# Patient Record
Sex: MALE | Race: BLACK OR AFRICAN AMERICAN | Employment: STUDENT | URBAN - METROPOLITAN AREA
[De-identification: names, ages, dates, MRNs, and addresses within clinical notes are randomized per-mention and may not be internally consistent; named-entity substitution may affect disease eponyms.]

---

## 2018-12-28 ENCOUNTER — HOSPITAL ENCOUNTER (EMERGENCY)
Facility: CLINIC | Age: 23
Discharge: HOME | End: 2018-12-28
Attending: EMERGENCY MEDICINE | Admitting: EMERGENCY MEDICINE
Payer: COMMERCIAL

## 2018-12-28 VITALS
OXYGEN SATURATION: 99 % | TEMPERATURE: 98.5 F | DIASTOLIC BLOOD PRESSURE: 77 MMHG | SYSTOLIC BLOOD PRESSURE: 139 MMHG | HEART RATE: 75 BPM

## 2018-12-28 DIAGNOSIS — M62.838 MUSCLE SPASM: ICD-10-CM

## 2018-12-28 DIAGNOSIS — M54.2 NECK PAIN: ICD-10-CM

## 2018-12-28 PROCEDURE — 99284 EMERGENCY DEPT VISIT MOD MDM: CPT | Mod: Z6 | Performed by: EMERGENCY MEDICINE

## 2018-12-28 PROCEDURE — 99282 EMERGENCY DEPT VISIT SF MDM: CPT | Performed by: EMERGENCY MEDICINE

## 2018-12-28 RX ORDER — ALBUTEROL SULFATE 90 UG/1
2 AEROSOL, METERED RESPIRATORY (INHALATION) PRN
COMMUNITY
End: 2019-05-22

## 2018-12-28 RX ORDER — NAPROXEN 500 MG/1
500 TABLET ORAL 2 TIMES DAILY WITH MEALS
Qty: 16 TABLET | Refills: 0 | Status: SHIPPED | OUTPATIENT
Start: 2018-12-28 | End: 2019-05-22

## 2018-12-28 RX ORDER — METHOCARBAMOL 500 MG/1
500 TABLET, FILM COATED ORAL AT BEDTIME
Qty: 5 TABLET | Refills: 0 | Status: SHIPPED | OUTPATIENT
Start: 2018-12-28 | End: 2019-05-22

## 2018-12-28 ASSESSMENT — ENCOUNTER SYMPTOMS
WEAKNESS: 0
BACK PAIN: 0
FEVER: 0
ABDOMINAL PAIN: 0
NECK PAIN: 1

## 2018-12-28 NOTE — TELEPHONE ENCOUNTER
FUTURE VISIT INFORMATION      FUTURE VISIT INFORMATION:    Date: 12/31/18    Time:     [place] [facility] [place] INFORMATION:    Referring provider:  self    Referring providers clinic:      Reason for visit/diagnosis  Neck pain causing numbness - ER notes in Epic - No xrays - Per pt's mother     RECORDS REQUESTED FROM:       Clinic name Comments Records Status Imaging Status     internal

## 2018-12-28 NOTE — ED AVS SNAPSHOT
[facility], [place], Emergency Department  [address] [place] [id number]  Phone:  [phone]                                    Christopher Fernandez   MRN: [id number]    Department:  [facility], Emergency Department   Date of Visit:  12/28/2018           After Visit Summary Signature Page    I have received my discharge instructions, and my questions have been answered. I have discussed any challenges I see with this plan with the nurse or doctor.    ..........................................................................................................................................  Patient/Patient Representative Signature      ..........................................................................................................................................  Patient Representative Print Name and Relationship to Patient    ..................................................               ................................................  Date                                   Time    ..........................................................................................................................................  Reviewed by Signature/Title    ...................................................              ..............................................  Date                                               Time          22EPIC Rev 08/18

## 2018-12-28 NOTE — LETTER
December 28, 2018      To Whom It May Concern:      Christopher Fernandez was seen in our Emergency Department today, 12/28/18.  I expect his condition to improve over the next 3-5 days.  He may return to work/school when improved.  He should avoid heavy lifting, twisting, extensive use of neck.    Sincerely,        Mary Carmen Sharma MD

## 2018-12-28 NOTE — ED TRIAGE NOTES
Pt comes in with c/o neck pain with intermittent n/t down all 4 extremities. Pain/symptoms began about 2 weeks ago, but symptoms became very bad last night. Alert and oriented, vss.

## 2018-12-28 NOTE — DISCHARGE INSTRUCTIONS
Take new medications as prescribed  Continue to apply warm compresses to sore areas and do gentle mobility exercises.  Consider a new pillow--there are online guides to buy a pillow based on your sleep position  Avoid chiropractic manipulation of your neck as this can damage the arteries in your neck.  See an orthopedist in 1 week  Return to the emergency department for worsening pain, weakness, numbness, loss of bowel/bladder control, inability to walk, fever, or if you have any new or changing symptoms/concerns.

## 2018-12-28 NOTE — ED PROVIDER NOTES
History     Chief Complaint   Patient presents with     Neck Pain     Numbness     The history is provided by the patient.     Christopher Fernandez is a 23 year old otherwise healthy male who presents to the Emergency Department for evaluation of back neck numbness and tingling. Patient reports that he has been experiencing back neck numbness and tingling for the past 2 weeks. To help with this, he has been using heat pads, massaging it, stretching it, and exercising. He has also taken Tylenol in the past. Last night, patient reports that after laying on his bed oddly, he stood up and felt a tingling pain that radiated from his back neck to his upper and lower extremities. He denies pain radiating to his chest, abdomen, or back. Patient thought that the pain would eventually go away but it didn't. He had an anxiety attack for the first time last night and decided to call paramedics. He was instructed to visit the ED.     Here, patient reports that he only experiences back neck numbness and tingling when turning his head. He currently has slight tingling on his upper and lower extremities. He reports that he has not taken up new activities that may affect his neck; however, still trains for basketball. He reports that he is currently congested. He denies generalized weakness. He denies a fever; however, had a fever a few days ago. He denies incontinence or numbness when using the restroom. Patient reports that he has a history of asthma. He drinks alcohol and smokes tobacco occasionally.     Past Medical History:   Diagnosis Date     Uncomplicated asthma        History reviewed. No pertinent surgical history.    No family history on file.    Social History     Tobacco Use     Smoking status: Not on file   Substance Use Topics     Alcohol use: Not on file       No current facility-administered medications for this encounter.      Current Outpatient Medications   Medication     albuterol (PROAIR HFA/PROVENTIL HFA/VENTOLIN  HFA) 108 (90 Base) MCG/ACT inhaler     methocarbamol (ROBAXIN) 500 MG tablet     naproxen (NAPROSYN) 500 MG tablet        Allergies   Allergen Reactions     Erythromycin      Penicillins      Zithromax [Azithromycin]        I have reviewed the Medications, Allergies, Past Medical and Surgical History, and Social History in the Epic system.    Review of Systems   Constitutional: Negative for fever.   HENT: Positive for congestion.    Cardiovascular: Negative for chest pain.   Gastrointestinal: Negative for abdominal pain.   Genitourinary:        Negative for incontinence    Musculoskeletal: Positive for neck pain (Back neck; numb; tingling sensation ). Negative for back pain.        Positive for slight tingling of upper and lower extremities that radiates from back neck   Neurological: Negative for weakness.   All other systems reviewed and are negative.      Physical Exam          Physical Exam   Constitutional: He appears well-developed and well-nourished. No distress.   HENT:   Head: Normocephalic and atraumatic.   Mouth/Throat: Oropharynx is clear and moist.   Eyes: Conjunctivae are normal. Pupils are equal, round, and reactive to light.   Cardiovascular: Normal rate and regular rhythm.   No carotid bruits, radial and ulnar pulses intact bilaterally   Pulmonary/Chest: Effort normal and breath sounds normal.   Musculoskeletal: Normal range of motion.   Bilateral trapezius tenderness and mild spasm, R>L.  No midline spine tenderness, 5/5 strength upper and lower extremities.       ED Course   9:32 AM  The patient was seen and examined by Mary Carmen Sharma MD in Room ED04.        Procedures             Critical Care time:  none             Labs Ordered and Resulted from Time of ED Arrival Up to the Time of Departure from the ED - No data to display         Assessments & Plan (with Medical Decision Making)   Christopher Fernandez is a 23 year old male with several days of neck pain not amenable to conservative treatment. No  trauma, no red flag symptoms, and no neurological deficits on exam or midline spine tenderness.  He describes paresthesias in all four extremities, however this is not consistent with a sensory level or cord compression/cauda equina.  There does not appear to be indication for an emergency MRI however recommended follow up with orthopedics for persistent symptoms.  Also recommended course of antiinflammatories, muscle relaxants, and rest from basketball practice.  Return precautions provided.    Mary Carmen Sharma MD on 12/30/2018 at 9:22 PM     I have reviewed the nursing notes.    I have reviewed the findings, diagnosis, plan and need for follow up with the patient.       Medication List      There are no discharge medications for this visit.         Final diagnoses:   None     ITriston, am serving as a trained medical scribe to document services personally performed by Mary Carmen Sharma MD, based on the provider's statements to me.      Mary Carmen SHORE MD, was physically present and have reviewed and verified the accuracy of this note documented by Triston Yanez    12/28/2018   [facility], [place], EMERGENCY DEPARTMENT     Mary Carmen Sharma MD  12/30/18 2123

## 2018-12-31 ENCOUNTER — ANCILLARY PROCEDURE (OUTPATIENT)
Dept: GENERAL RADIOLOGY | Facility: CLINIC | Age: 23
End: 2018-12-31
Attending: FAMILY MEDICINE
Payer: COMMERCIAL

## 2018-12-31 ENCOUNTER — OFFICE VISIT (OUTPATIENT)
Dept: ORTHOPEDICS | Facility: CLINIC | Age: 23
End: 2018-12-31
Payer: COMMERCIAL

## 2018-12-31 ENCOUNTER — ANCILLARY PROCEDURE (OUTPATIENT)
Dept: MRI IMAGING | Facility: CLINIC | Age: 23
End: 2018-12-31
Attending: FAMILY MEDICINE
Payer: COMMERCIAL

## 2018-12-31 ENCOUNTER — PRE VISIT (OUTPATIENT)
Dept: ORTHOPEDICS | Facility: CLINIC | Age: 23
End: 2018-12-31

## 2018-12-31 VITALS — BODY MASS INDEX: 25.41 KG/M2 | RESPIRATION RATE: 16 BRPM | WEIGHT: 198 LBS | HEIGHT: 74 IN

## 2018-12-31 DIAGNOSIS — Q74.9: ICD-10-CM

## 2018-12-31 DIAGNOSIS — M54.2 CERVICALGIA: ICD-10-CM

## 2018-12-31 DIAGNOSIS — M54.2 NECK PAIN: Primary | ICD-10-CM

## 2018-12-31 DIAGNOSIS — M54.10 RADICULOPATHY AFFECTING UPPER EXTREMITY: ICD-10-CM

## 2018-12-31 DIAGNOSIS — M54.2 NECK PAIN: ICD-10-CM

## 2018-12-31 ASSESSMENT — MIFFLIN-ST. JEOR: SCORE: 1963.9

## 2018-12-31 NOTE — LETTER
"  12/31/2018      RE: Christopher Fernandez III  [address] [facility] [address]       Sports Medicine Clinic Visit    PCP: No Ref-Primary, Physician    Christopher Fernandez III is a 23 year old male who is seen  in consultation at the request of ED presenting with neck pain. He notes recurrent episodes with numbness and tingling bilateral arms    Injury: no KWASI    Location of Pain: bilateral neck pain  Duration of Pain: 6 month(s)  Rating of Pain: 0/10  Pain is better with: naproxen, robaxin  Pain is worse with: Lying down, Cervical rotation  Additional Features: He is [place] with [employer]'s basketball  Treatment so far consists of: naproxen, robaxin, stretching, heat  Prior History of related problems: None    Resp 16   Ht 1.873 m (6' 1.75\")   Wt 89.8 kg (198 lb)   BMI 25.59 kg/m            PMH:  Past Medical History:   Diagnosis Date     Uncomplicated asthma        Active problem list:  There is no problem list on file for this patient.      FH:  No family history on file.    SH:  Social History     Socioeconomic History     Marital status: Single     Spouse name: Not on file     Number of children: Not on file     Years of education: Not on file     Highest education level: Not on file   Social Needs     Financial resource strain: Not on file     Food insecurity - worry: Not on file     Food insecurity - inability: Not on file     Transportation needs - medical: Not on file     Transportation needs - non-medical: Not on file   Occupational History     Not on file   Tobacco Use     Smoking status: Never Smoker     Smokeless tobacco: Never Used   Substance and Sexual Activity     Alcohol use: Not on file     Drug use: Not on file     Sexual activity: Not on file   Other Topics Concern     Not on file   Social History Narrative     Not on file       MEDS:  See EMR, reviewed  ALL:  See EMR, reviewed    REVIEW OF SYSTEMS:  CONSTITUTIONAL:NEGATIVE for fever, chills, change in weight  INTEGUMENTARY/SKIN: NEGATIVE for worrisome " rashes, moles or lesions  EYES: NEGATIVE for vision changes or irritation  ENT/MOUTH: NEGATIVE for ear, mouth and throat problems  RESP:NEGATIVE for significant cough or SOB  BREAST: NEGATIVE for masses, tenderness or discharge  CV: NEGATIVE for chest pain, palpitations or peripheral edema  GI: NEGATIVE for nausea, abdominal pain, heartburn, or change in bowel habits  :NEGATIVE for frequency, dysuria, or hematuria  :NEGATIVE for frequency, dysuria, or hematuria  NEURO: NEGATIVE for weakness, dizziness or paresthesias  ENDOCRINE: NEGATIVE for temperature intolerance, skin/hair changes  HEME/ALLERGY/IMMUNE: NEGATIVE for bleeding problems  PSYCHIATRIC: NEGATIVE for changes in mood or affect    Subjective: 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  Earlier this year he saw manual clinician who is doing adjustments to his neck because of recurrent neck pain that he could feel into the upper back.  It seemed to help him at the time.  He denies a past history of neck injury or recurrent neck problems.  He does not have numbness and tingling in the upper extremities today.  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  Despite this he played division 3 basketball through college and was successful without episodes of neck discomfort or injury at that time.    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  There are some residual remnants of digits.  The wrist, radius and ulna appear intact.  He has full range of motion to the cervical spine and Spurling's maneuver and axial load tests are without radicular discomfort.  There are no impingement signs at either shoulder and strength is  intact bilaterally at deltoid supraspinatus infraspinatus subscapularis biceps triceps forearm flexion extension grasp and digit he minimi strength.  He is limited on the left on exam because of lack of the fingers as mentioned.  Distal pulses are intact.  Sensation is intact today appropriate in conversation and affect.    An x-ray of the neck including flexion extension views shows no signs of instability or obvious degenerative change    Assessment recurrent cervicalgia and bilateral upper extremity discomfort    Plan: He has had this problem recurrently over the last year and more persistently in the last 6 weeks.  An MRI of the cervical spine is pending to rule out stenosis or disc impingement.  He will follow-up after the MRI to go over results.                    Yadiel Childers MD

## 2018-12-31 NOTE — PROGRESS NOTES
"Sports Medicine Clinic Visit    PCP: No Ref-Primary, Physician    Christopher Fernandez III is a 23 year old male who is seen  in consultation at the request of ED presenting with neck pain. He notes recurrent episodes with numbness and tingling bilateral arms    Injury: no KWASI    Location of Pain: bilateral neck pain  Duration of Pain: 6 month(s)  Rating of Pain: 0/10  Pain is better with: naproxen, robaxin  Pain is worse with: Lying down, Cervical rotation  Additional Features: He is [place] with [employer] basketball  Treatment so far consists of: naproxen, robaxin, stretching, heat  Prior History of related problems: None    Resp 16   Ht 1.873 m (6' 1.75\")   Wt 89.8 kg (198 lb)   BMI 25.59 kg/m           PMH:  Past Medical History:   Diagnosis Date     Uncomplicated asthma        Active problem list:  There is no problem list on file for this patient.      FH:  No family history on file.    SH:  Social History     Socioeconomic History     Marital status: Single     Spouse name: Not on file     Number of children: Not on file     Years of education: Not on file     Highest education level: Not on file   Social Needs     Financial resource strain: Not on file     Food insecurity - worry: Not on file     Food insecurity - inability: Not on file     Transportation needs - medical: Not on file     Transportation needs - non-medical: Not on file   Occupational History     Not on file   Tobacco Use     Smoking status: Never Smoker     Smokeless tobacco: Never Used   Substance and Sexual Activity     Alcohol use: Not on file     Drug use: Not on file     Sexual activity: Not on file   Other Topics Concern     Not on file   Social History Narrative     Not on file       MEDS:  See EMR, reviewed  ALL:  See EMR, reviewed    REVIEW OF SYSTEMS:  CONSTITUTIONAL:NEGATIVE for fever, chills, change in weight  INTEGUMENTARY/SKIN: NEGATIVE for worrisome rashes, moles or lesions  EYES: NEGATIVE for vision changes or irritation  ENT/MOUTH: " NEGATIVE for ear, mouth and throat problems  RESP:NEGATIVE for significant cough or SOB  BREAST: NEGATIVE for masses, tenderness or discharge  CV: NEGATIVE for chest pain, palpitations or peripheral edema  GI: NEGATIVE for nausea, abdominal pain, heartburn, or change in bowel habits  :NEGATIVE for frequency, dysuria, or hematuria  :NEGATIVE for frequency, dysuria, or hematuria  NEURO: NEGATIVE for weakness, dizziness or paresthesias  ENDOCRINE: NEGATIVE for temperature intolerance, skin/hair changes  HEME/ALLERGY/IMMUNE: NEGATIVE for bleeding problems  PSYCHIATRIC: NEGATIVE for changes in mood or affect    Subjective: 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  Earlier this year he saw manual clinician who is doing adjustments to his neck because of recurrent neck pain that he could feel into the upper back.  It seemed to help him at the time.  He denies a past history of neck injury or recurrent neck problems.  He does not have numbness and tingling in the upper extremities today.  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  Despite this he played division 3 basketball through college and was successful without episodes of neck discomfort or injury at that time.    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  There are some residual remnants of digits.  The wrist, radius and ulna appear intact.  He has full range of motion to the cervical spine and Spurling's maneuver and axial load tests are without radicular discomfort.  There are no impingement signs at either shoulder and strength is intact bilaterally at deltoid supraspinatus infraspinatus subscapularis biceps triceps  forearm flexion extension grasp and digit he minimi strength.  He is limited on the left on exam because of lack of the fingers as mentioned.  Distal pulses are intact.  Sensation is intact today appropriate in conversation and affect.    An x-ray of the neck including flexion extension views shows no signs of instability or obvious degenerative change    Assessment recurrent cervicalgia and bilateral upper extremity discomfort    Plan: He has had this problem recurrently over the last year and more persistently in the last 6 weeks.  An MRI of the cervical spine is pending to rule out stenosis or disc impingement.  He will follow-up after the MRI to go over results.

## 2018-12-31 NOTE — LETTER
Date:January 2, 2019      Patient was self referred, no letter generated. Do not send.        [facility] Information

## 2019-01-07 ENCOUNTER — OFFICE VISIT (OUTPATIENT)
Dept: ORTHOPEDICS | Facility: CLINIC | Age: 24
End: 2019-01-07
Payer: COMMERCIAL

## 2019-01-07 VITALS — HEIGHT: 74 IN | RESPIRATION RATE: 16 BRPM | WEIGHT: 198 LBS | BODY MASS INDEX: 25.41 KG/M2

## 2019-01-07 DIAGNOSIS — M54.2 CERVICALGIA: Primary | ICD-10-CM

## 2019-01-07 ASSESSMENT — MIFFLIN-ST. JEOR: SCORE: 1963.9

## 2019-01-07 NOTE — LETTER
1/7/2019      RE: Christopher Fernandez III  [address]  [facility] [address]       January 7, 2019: Christopher Fernandez III is a 23 year old male who is seen in f/u up for cervicalgia. He is here to review MRI rsults on 12/31/18.        PMH:  Past Medical History:   Diagnosis Date     Uncomplicated asthma        Active problem list:  There is no problem list on file for this patient.      FH:  No family history on file.    SH:  Social History     Socioeconomic History     Marital status: Single     Spouse name: Not on file     Number of children: Not on file     Years of education: Not on file     Highest education level: Not on file   Social Needs     Financial resource strain: Not on file     Food insecurity - worry: Not on file     Food insecurity - inability: Not on file     Transportation needs - medical: Not on file     Transportation needs - non-medical: Not on file   Occupational History     Not on file   Tobacco Use     Smoking status: Never Smoker     Smokeless tobacco: Never Used   Substance and Sexual Activity     Alcohol use: Not on file     Drug use: Not on file     Sexual activity: Not on file   Other Topics Concern     Not on file   Social History Narrative     Not on file       MEDS:  See EMR, reviewed  ALL:  See EMR, reviewed    REVIEW OF SYSTEMS:  CONSTITUTIONAL:NEGATIVE for fever, chills, change in weight  INTEGUMENTARY/SKIN: NEGATIVE for worrisome rashes, moles or lesions  EYES: NEGATIVE for vision changes or irritation  ENT/MOUTH: NEGATIVE for ear, mouth and throat problems  RESP:NEGATIVE for significant cough or SOB  BREAST: NEGATIVE for masses, tenderness or discharge  CV: NEGATIVE for chest pain, palpitations or peripheral edema  GI: NEGATIVE for nausea, abdominal pain, heartburn, or change in bowel habits  :NEGATIVE for frequency, dysuria, or hematuria  :NEGATIVE for frequency, dysuria, or hematuria  NEURO: NEGATIVE for weakness, dizziness or paresthesias  ENDOCRINE: NEGATIVE for  temperature intolerance, skin/hair changes  HEME/ALLERGY/IMMUNE: NEGATIVE for bleeding problems  PSYCHIATRIC: NEGATIVE for changes in mood or affect          MR CERVICAL SPINE W/O CONTRAST 12/31/2018 9:22 AM     Provided History: Radiculopathy; recurrent neck pain x 6 weeks with  intermittent bilateral arm numbness.  h/o congential left adactyly;  Cervicalgia; Radiculopathy affecting upper extremity; Adactyly     Comparison: C-spine radiograph from 12/31/2018     Technique: Sagittal T1-weighted, sagittal T2-weighted, sagittal STIR,  sagittal diffusion weighted, axial T2-weighted, and axial T2* gradient  echo images of the cervical spine were obtained without intravenous  contrast.     Findings:  The cervical vertebrae are normally aligned.  There is no disc height  narrowing.  There is normal signal within and normal contour of the  cervical spinal cord.  The findings on a level by level basis are as  follows:     C2-3:  No spinal canal or neural foraminal stenosis.     C3-4:  No spinal canal or neural foraminal stenosis     C4-5: Minimal posterior disc bulge. No spinal canal or neural  foraminal stenosis.     C5-6: Small central disc protrusion and annular fissuring. Mild spinal  canal narrowing. No neural foraminal stenosis.     C6-7:  No spinal canal or neural foraminal stenosis.     C7-T1:  No spinal canal or neural foraminal stenosis.     No abnormality of the paraspinous soft tissues.                                                                      Impression:   1. Mild spinal canal narrowing secondary to a small disc protrusion at  C5-6.  2. No cord signal abnormality.     I have personally reviewed the examination and initial interpretation  and I agree with the findings.     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  There are some residual remnants of digits.  The wrist, radius and ulna appear intact.  He has full range of motion to the cervical  spine and Spurling's maneuver and axial load tests are without radicular discomfort.  There are no impingement signs at either shoulder and strength is intact bilaterally at deltoid supraspinatus infraspinatus subscapularis biceps triceps forearm flexion extension grasp and digit he minimi strength.  He is limited on the left on exam because of lack of the fingers as mentioned.  Distal pulses are intact.  Sensation is intact today appropriate in conversation and affect.  Left TM wnl;; no ear wax obstruction noted.    Assessment recurrent cervicalgia and bilateral upper extremity discomfort    We went over his MRI in detail the help of a model.  He has some mild degenerative disc changes at C4-C5 C5-C6 but no significant stenosis or impingement.  He does not have radicular discomfort currently.  He is asking about maintenance exercises.  We talked about exercises to avoid in the weight room, talked about the ergonomics of the seated position, talked about ergonomics within the weight room and talked about core strengthening exercises, posterior shoulder exercises and approaches that would be reasonable for training this.  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  He will try to get some training in this from the weight room staff.  If the problem is recurrent he agrees to follow-up in clinic.    Yadiel Childers MD

## 2019-01-07 NOTE — LETTER
Date:January 8, 2019      Patient was self referred, no letter generated. Do not send.        [facility] Information

## 2019-01-07 NOTE — PROGRESS NOTES
January 7, 2019: Christopher Fernandez III is a 23 year old male who is seen in f/u up for cervicalgia. He is here to review MRI rsults on 12/31/18.        PMH:  Past Medical History:   Diagnosis Date     Uncomplicated asthma        Active problem list:  There is no problem list on file for this patient.      FH:  No family history on file.    SH:  Social History     Socioeconomic History     Marital status: Single     Spouse name: Not on file     Number of children: Not on file     Years of education: Not on file     Highest education level: Not on file   Social Needs     Financial resource strain: Not on file     Food insecurity - worry: Not on file     Food insecurity - inability: Not on file     Transportation needs - medical: Not on file     Transportation needs - non-medical: Not on file   Occupational History     Not on file   Tobacco Use     Smoking status: Never Smoker     Smokeless tobacco: Never Used   Substance and Sexual Activity     Alcohol use: Not on file     Drug use: Not on file     Sexual activity: Not on file   Other Topics Concern     Not on file   Social History Narrative     Not on file       MEDS:  See EMR, reviewed  ALL:  See EMR, reviewed    REVIEW OF SYSTEMS:  CONSTITUTIONAL:NEGATIVE for fever, chills, change in weight  INTEGUMENTARY/SKIN: NEGATIVE for worrisome rashes, moles or lesions  EYES: NEGATIVE for vision changes or irritation  ENT/MOUTH: NEGATIVE for ear, mouth and throat problems  RESP:NEGATIVE for significant cough or SOB  BREAST: NEGATIVE for masses, tenderness or discharge  CV: NEGATIVE for chest pain, palpitations or peripheral edema  GI: NEGATIVE for nausea, abdominal pain, heartburn, or change in bowel habits  :NEGATIVE for frequency, dysuria, or hematuria  :NEGATIVE for frequency, dysuria, or hematuria  NEURO: NEGATIVE for weakness, dizziness or paresthesias  ENDOCRINE: NEGATIVE for temperature intolerance, skin/hair changes  HEME/ALLERGY/IMMUNE: NEGATIVE for bleeding  problems  PSYCHIATRIC: NEGATIVE for changes in mood or affect          MR CERVICAL SPINE W/O CONTRAST 12/31/2018 9:22 AM     Provided History: Radiculopathy; recurrent neck pain x 6 weeks with  intermittent bilateral arm numbness.  h/o congential left adactyly;  Cervicalgia; Radiculopathy affecting upper extremity; Adactyly     Comparison: C-spine radiograph from 12/31/2018     Technique: Sagittal T1-weighted, sagittal T2-weighted, sagittal STIR,  sagittal diffusion weighted, axial T2-weighted, and axial T2* gradient  echo images of the cervical spine were obtained without intravenous  contrast.     Findings:  The cervical vertebrae are normally aligned.  There is no disc height  narrowing.  There is normal signal within and normal contour of the  cervical spinal cord.  The findings on a level by level basis are as  follows:     C2-3:  No spinal canal or neural foraminal stenosis.     C3-4:  No spinal canal or neural foraminal stenosis     C4-5: Minimal posterior disc bulge. No spinal canal or neural  foraminal stenosis.     C5-6: Small central disc protrusion and annular fissuring. Mild spinal  canal narrowing. No neural foraminal stenosis.     C6-7:  No spinal canal or neural foraminal stenosis.     C7-T1:  No spinal canal or neural foraminal stenosis.     No abnormality of the paraspinous soft tissues.                                                                      Impression:   1. Mild spinal canal narrowing secondary to a small disc protrusion at  C5-6.  2. No cord signal abnormality.     I have personally reviewed the examination and initial interpretation  and I agree with the findings.     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  There are some residual remnants of digits.  The wrist, radius and ulna appear intact.  He has full range of motion to the cervical spine and Spurling's maneuver and axial load tests are without radicular discomfort.  There  are no impingement signs at either shoulder and strength is intact bilaterally at deltoid supraspinatus infraspinatus subscapularis biceps triceps forearm flexion extension grasp and digit he minimi strength.  He is limited on the left on exam because of lack of the fingers as mentioned.  Distal pulses are intact.  Sensation is intact today appropriate in conversation and affect.  Left TM wnl;; no ear wax obstruction noted.    Assessment recurrent cervicalgia and bilateral upper extremity discomfort    We went over his MRI in detail the help of a model.  He has some mild degenerative disc changes at C4-C5 C5-C6 but no significant stenosis or impingement.  He does not have radicular discomfort currently.  He is asking about maintenance exercises.  We talked about exercises to avoid in the weight room, talked about the ergonomics of the seated position, talked about ergonomics within the weight room and talked about core strengthening exercises, posterior shoulder exercises and approaches that would be reasonable for training this.  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  He will try to get some training in this from the weight room staff.  If the problem is recurrent he agrees to follow-up in clinic.

## 2019-05-22 ENCOUNTER — TELEPHONE (OUTPATIENT)
Dept: FAMILY MEDICINE | Facility: CLINIC | Age: 24
End: 2019-05-22

## 2019-05-22 ENCOUNTER — APPOINTMENT (OUTPATIENT)
Dept: LAB | Facility: CLINIC | Age: 24
End: 2019-05-22
Payer: COMMERCIAL

## 2019-05-22 ENCOUNTER — OFFICE VISIT (OUTPATIENT)
Dept: FAMILY MEDICINE | Facility: CLINIC | Age: 24
End: 2019-05-22
Payer: COMMERCIAL

## 2019-05-22 VITALS
WEIGHT: 214.9 LBS | DIASTOLIC BLOOD PRESSURE: 62 MMHG | OXYGEN SATURATION: 97 % | HEART RATE: 70 BPM | SYSTOLIC BLOOD PRESSURE: 116 MMHG | BODY MASS INDEX: 27.78 KG/M2

## 2019-05-22 DIAGNOSIS — Z11.3 ROUTINE SCREENING FOR STI (SEXUALLY TRANSMITTED INFECTION): ICD-10-CM

## 2019-05-22 DIAGNOSIS — J45.909 MILD ASTHMA, UNSPECIFIED WHETHER COMPLICATED, UNSPECIFIED WHETHER PERSISTENT: ICD-10-CM

## 2019-05-22 DIAGNOSIS — F41.9 ANXIETY: Primary | ICD-10-CM

## 2019-05-22 DIAGNOSIS — Z23 NEED FOR HPV VACCINE: ICD-10-CM

## 2019-05-22 LAB
ANION GAP SERPL CALCULATED.3IONS-SCNC: 7 MMOL/L (ref 3–14)
BUN SERPL-MCNC: 9 MG/DL (ref 7–30)
CALCIUM SERPL-MCNC: 9.6 MG/DL (ref 8.5–10.1)
CHLORIDE SERPL-SCNC: 105 MMOL/L (ref 94–109)
CO2 SERPL-SCNC: 28 MMOL/L (ref 20–32)
CREAT SERPL-MCNC: 1.09 MG/DL (ref 0.66–1.25)
GFR SERPL CREATININE-BSD FRML MDRD: >90 ML/MIN/{1.73_M2}
GLUCOSE SERPL-MCNC: 99 MG/DL (ref 70–99)
HIV 1+2 AB+HIV1 P24 AG SERPL QL IA: NONREACTIVE
POTASSIUM SERPL-SCNC: 4.1 MMOL/L (ref 3.4–5.3)
SODIUM SERPL-SCNC: 141 MMOL/L (ref 133–144)
TSH SERPL DL<=0.005 MIU/L-ACNC: 0.8 MU/L (ref 0.4–4)

## 2019-05-22 RX ORDER — ALBUTEROL SULFATE 90 UG/1
2 AEROSOL, METERED RESPIRATORY (INHALATION) EVERY 6 HOURS PRN
Qty: 8.5 G | Refills: 11 | Status: SHIPPED | OUTPATIENT
Start: 2019-05-22

## 2019-05-22 RX ORDER — ALBUTEROL SULFATE 90 UG/1
2 AEROSOL, METERED RESPIRATORY (INHALATION) PRN
Qty: 8.5 G | Refills: 11 | Status: SHIPPED | OUTPATIENT
Start: 2019-05-22 | End: 2019-05-22

## 2019-05-22 SDOH — HEALTH STABILITY: MENTAL HEALTH: HOW OFTEN DO YOU HAVE 6 OR MORE DRINKS ON ONE OCCASION?: LESS THAN MONTHLY

## 2019-05-22 SDOH — HEALTH STABILITY: MENTAL HEALTH: HOW MANY STANDARD DRINKS CONTAINING ALCOHOL DO YOU HAVE ON A TYPICAL DAY?: 3 OR 4

## 2019-05-22 SDOH — HEALTH STABILITY: MENTAL HEALTH: HOW OFTEN DO YOU HAVE A DRINK CONTAINING ALCOHOL?: 2-4 TIMES A MONTH

## 2019-05-22 ASSESSMENT — ENCOUNTER SYMPTOMS
SINUS PRESSURE: 0
DIZZINESS: 0
AGITATION: 0
HALLUCINATIONS: 0
SLEEP DISTURBANCE: 0
TREMORS: 0
SINUS PAIN: 0
NERVOUS/ANXIOUS: 0
APPETITE CHANGE: 0
NUMBNESS: 1
WEAKNESS: 0
CONFUSION: 0
FACIAL ASYMMETRY: 0
HYPERACTIVE: 0
COUGH: 0
HEADACHES: 1
WHEEZING: 0
LIGHT-HEADEDNESS: 0
CHEST TIGHTNESS: 0
CHOKING: 0
FEVER: 0
SORE THROAT: 0
DIAPHORESIS: 0
FATIGUE: 0
SPEECH DIFFICULTY: 0
ACTIVITY CHANGE: 0
SEIZURES: 0
DECREASED CONCENTRATION: 0
SHORTNESS OF BREATH: 0

## 2019-05-22 ASSESSMENT — ANXIETY QUESTIONNAIRES
GAD7 TOTAL SCORE: 10
2. NOT BEING ABLE TO STOP OR CONTROL WORRYING: MORE THAN HALF THE DAYS
IF YOU CHECKED OFF ANY PROBLEMS ON THIS QUESTIONNAIRE, HOW DIFFICULT HAVE THESE PROBLEMS MADE IT FOR YOU TO DO YOUR WORK, TAKE CARE OF THINGS AT HOME, OR GET ALONG WITH OTHER PEOPLE: NOT DIFFICULT AT ALL
7. FEELING AFRAID AS IF SOMETHING AWFUL MIGHT HAPPEN: NOT AT ALL
6. BECOMING EASILY ANNOYED OR IRRITABLE: SEVERAL DAYS
3. WORRYING TOO MUCH ABOUT DIFFERENT THINGS: MORE THAN HALF THE DAYS
1. FEELING NERVOUS, ANXIOUS, OR ON EDGE: MORE THAN HALF THE DAYS
5. BEING SO RESTLESS THAT IT IS HARD TO SIT STILL: SEVERAL DAYS

## 2019-05-22 ASSESSMENT — PATIENT HEALTH QUESTIONNAIRE - PHQ9
5. POOR APPETITE OR OVEREATING: MORE THAN HALF THE DAYS
SUM OF ALL RESPONSES TO PHQ QUESTIONS 1-9: 4

## 2019-05-22 ASSESSMENT — PAIN SCALES - GENERAL: PAINLEVEL: NO PAIN (0)

## 2019-05-22 NOTE — NURSING NOTE
24 year old  Chief Complaint   Patient presents with     Establish Care     Pt is here to establish care.       Blood pressure 116/62, pulse 70, weight 97.5 kg (214 lb 14.4 oz), SpO2 97 %. Body mass index is 27.78 kg/m .  BP completed using cuff size:    There is no problem list on file for this patient.      Wt Readings from Last 2 Encounters:   05/22/19 97.5 kg (214 lb 14.4 oz)   01/07/19 89.8 kg (198 lb)     BP Readings from Last 3 Encounters:   05/22/19 116/62   12/28/18 139/77       Allergies   Allergen Reactions     Erythromycin      Penicillins      Zithromax [Azithromycin]        Current Outpatient Medications   Medication     albuterol (PROAIR HFA/PROVENTIL HFA/VENTOLIN HFA) 108 (90 Base) MCG/ACT inhaler     No current facility-administered medications for this visit.        Social History     Tobacco Use     Smoking status: Never Smoker     Smokeless tobacco: Never Used   Substance Use Topics     Alcohol use: None     Drug use: None       Honoring Choices - Health Care Directive Guide offered to patient at time of visit.    Health Maintenance Due   Topic Date Due     PREVENTIVE CARE VISIT  1996     DTAP/TDAP/TD IMMUNIZATION (1 - Tdap) 02/12/2003     HIV SCREENING  02/12/2011     PHQ-2  01/01/2019         There is no immunization history on file for this patient.    No results found for: PAP      No lab results found.    PHQ-2 ( 1999 Pfizer) 5/22/2019   Q1: Little interest or pleasure in doing things 1   Q2: Feeling down, depressed or hopeless 0   PHQ-2 Score 1       No flowsheet data found.    No flowsheet data found.    No flowsheet data found.      Ella Do, PARAM  May 22, 2019 8:38 AM

## 2019-05-22 NOTE — PATIENT INSTRUCTIONS
[facility]:   [phone]  [address]   [address], [address]  [place] [address]    Wednesday 8AM-6PM  Thursday 8AM-6PM  Friday 8AM-6PM  Saturday Closed  Sunday Closed  Monday         8AM-6PM  Tuesday 8AM-6PM    [facility]: [email or web address]    Start HPV series today - 2nd vaccine due 7/22/19, 3rd and final vaccine due 11/22/19. You do not need a provider visit for this, you can schedule a lab or nurse only one to have the shot completed.     [facility] Medication Refill Request Information:/  * Please contact your pharmacy regarding ANY request for medication refills.  ** NP Clinic Prescription Fax = [phone]  * Please allow 3 business days for routine medication refills.  * Please allow 5 business days for controlled substance medication refills.     [facility] Test Result notification information:  *You will be notified with in 7-10 days of your appointment day regarding the results of your test.  If you are on MyChart you will be notified as soon as the provider has reviewed the results and signed off on them.    [facility]: [phone]

## 2019-05-22 NOTE — TELEPHONE ENCOUNTER
Health Call Center    Phone Message    May a detailed message be left on voicemail: no    Reason for Call: Medication Question or concern regarding medication   Prescription Clarification  Name of Medication: albuterol (PROAIR HFA/PROVENTIL HFA/VENTOLIN HFA) 108 (90 Base) MCG/ACT inhaler  Prescribing Provider: Tiera Sifuentes   Pharmacy: [facility] in [facility], [address]   What on the order needs clarification? Rx doesn't have a frequency/max per day per law, please call back to provide info          Action Taken: Message routed to:  [facility] ([facility]): [facility]

## 2019-05-22 NOTE — PROGRESS NOTES
"       HPI       Chrisotpher Fernandez III is a 24 year old  who presents for   Chief Complaint   Patient presents with     Establish Care     Pt is here to establish care.     Christopher is a 24 year old male with PMH of uncomplicated mild intermittent asthma. Christopher is a  with the [employer] and works with the  Basketball team, is hoping to focus on player development.     He is here to discuss his second panic attack in a 5 month window and if possible start to learn coping mechanisms to help him with. First panic attack occurred shortly after he suffererred a cervical spine strain in December 2018 which he believes triggered it, second was Monday 5/20/19 which originally presented with an onset of a left sided temporal lobe headache. Panic attack symptoms presented with numbness in feet, right hand, dry mouth, racing heart rate, and \"pressure\" sensation to left side of head. With these symptoms Christopher decided to call 911. Was evaluated by EMS and deemed everything looked fine, was not transported to hospital for further evaluation. Does not routinely get headaches.     With headache has never had one like this before, had some mild changes with vision with lights had a halo but denies any vision loss, eye pain, sinus congestion or allergies. No dizziness, no thunderclap sensation, not rated as the worst headache of his life, no nausea or vomiting, no lightheadedness. No head trauma. No fevers, chills, fatigue. Had slight headache on Tuesday without pressure sensation which has sense resolved without any additional analgesia.     Denies any known underlying anxiety or depression that is problematic.   PHQ-9 SCORE 5/22/2019   PHQ-9 Total Score 4     ADIS-7 SCORE 5/22/2019   Total Score 10     Has history of mild, intermittent asthma and controlled with as needed albuterol inhaler. Used to use inhaler daily as preventive medication when he was playing basketball, now only needs rarely. Unable to recall " when he last had to use but does know he is in need of a refill. ACT score 24.     Problem, Medication and Allergy Lists were       Current Outpatient Medications   Medication Sig Dispense Refill     albuterol (PROAIR HFA/PROVENTIL HFA/VENTOLIN HFA) 108 (90 Base) MCG/ACT inhaler Inhale 2 puffs into the lungs every 6 hours as needed for shortness of breath / dyspnea or wheezing 8.5 g 11         Allergies   Allergen Reactions     Erythromycin      Penicillins      Zithromax [Azithromycin]      Patient is a new patient to this clinic and so  I reviewed/updated the Past Medical History, the Family History and the Social History .         Review of Systems:   Review of Systems   Constitutional: Negative for activity change, appetite change, diaphoresis, fatigue and fever.   HENT: Negative for congestion, sinus pressure, sinus pain and sore throat.    Respiratory: Negative for cough, choking, chest tightness, shortness of breath and wheezing.    Cardiovascular: Negative for chest pain.   Skin: Negative for rash.   Neurological: Positive for numbness and headaches. Negative for dizziness, tremors, seizures, syncope, facial asymmetry, speech difficulty, weakness and light-headedness.   Psychiatric/Behavioral: Negative for agitation, confusion, decreased concentration, hallucinations, self-injury, sleep disturbance and suicidal ideas. The patient is not nervous/anxious and is not hyperactive.    All other systems reviewed and are negative.           Physical Exam:     Vitals:    05/22/19 0834   BP: 116/62   Pulse: 70   SpO2: 97%   Weight: 97.5 kg (214 lb 14.4 oz)     Body mass index is 27.78 kg/m .  Vitals were reviewed and were normal     Physical Exam   Constitutional: He is oriented to person, place, and time. He appears well-developed and well-nourished. No distress.   HENT:   Head: Normocephalic and atraumatic.   Right Ear: External ear normal.   Left Ear: External ear normal.   Nose: Nose normal.   Eyes: Pupils are  equal, round, and reactive to light. Conjunctivae and EOM are normal. Right eye exhibits no discharge. Left eye exhibits no discharge. No scleral icterus.   Neck: Normal range of motion. Neck supple. No thyromegaly present.   Cardiovascular: Normal rate, regular rhythm and normal heart sounds. Exam reveals no gallop and no friction rub.   No murmur heard.  Pulmonary/Chest: Effort normal and breath sounds normal. No stridor. No respiratory distress. He has no wheezes. He has no rales.   Lymphadenopathy:     He has no cervical adenopathy.   Neurological: He is alert and oriented to person, place, and time. He has normal strength. He displays no atrophy and no tremor. No cranial nerve deficit. He exhibits normal muscle tone. He displays a negative Romberg sign. Coordination normal.   Skin: Skin is warm and dry. Capillary refill takes less than 2 seconds. He is not diaphoretic.   Psychiatric: He has a normal mood and affect. His speech is normal and behavior is normal.   Nursing note and vitals reviewed.      Results:   Results are ordered and pending    Assessment and Plan        1. Anxiety  Uncontrolled. ADIS-7 score reflective of underlying moderate anxiety. Discussed with Christopher and he is not currently interested in any medication stating he feels overall he is managing his daily anxiety well without disruption to his life. Reviewed different options from daily medication, PRN non-habit forming medication for acute panic attacks (such as hydroxyzine), or meeting with a counselor to help learn tips and techniques to help with acute panic attacks. Christopher felt for him the best course would be to start with counseling and go from there as panic attacks is what brought him in to clinic in the first place. Information given for two different counseling services and patient advised to check with insurance prior to scheduling to ensure they are in-network for him.   ADIS-7 SCORE 5/22/2019   Total Score 10   - TSH with free T4  reflex  - Basic metabolic panel    2. Mild asthma, unspecified whether complicated, unspecified whether persistent  Controlled. Refills sent of PRN albuterol inhaler. ACT score 24 demonstrates excellent control. Advised follow up if things change or he needs to use HFA more than 2x/week.   - albuterol (PROAIR HFA/PROVENTIL HFA/VENTOLIN HFA) 108 (90 Base) MCG/ACT inhaler; Inhale 2 puffs into the lungs every 6 hours as needed for shortness of breath / dyspnea or wheezing  Dispense: 8.5 g; Refill: 11    3. Routine screening for STI (sexually transmitted infection)  Has never been tested for STIs. Does use condoms with each sexual encounter. Willing to have screening completed today.   - C. trachomatis PCR - Endocervical Swab, Clinic Collect  - N. gonorrhea PCR - Urine, Clinic Collect  - HIV Antigen Antibody Combo  - Treponema Abs w Reflex to RPR and Titer    4. Need for HPV vaccine  - HPV VACCINE (GARDASIL 9), 9 VALENT       There are no discontinued medications.    Options for treatment and follow-up care were reviewed with the patient. Christopher Fernandez III  engaged in the decision making process and verbalized understanding of the options discussed and agreed with the final plan.    MIKE Martinez CNP

## 2019-05-23 LAB
C TRACH DNA SPEC QL NAA+PROBE: NEGATIVE
N GONORRHOEA DNA SPEC QL NAA+PROBE: NEGATIVE
SPECIMEN SOURCE: NORMAL
SPECIMEN SOURCE: NORMAL

## 2019-05-23 ASSESSMENT — ASTHMA QUESTIONNAIRES: ACT_TOTALSCORE: 24

## 2019-05-23 ASSESSMENT — ANXIETY QUESTIONNAIRES: GAD7 TOTAL SCORE: 10

## 2019-05-24 LAB — T PALLIDUM AB SER QL: NONREACTIVE

## 2020-03-11 ENCOUNTER — HEALTH MAINTENANCE LETTER (OUTPATIENT)
Age: 25
End: 2020-03-11

## 2021-01-03 ENCOUNTER — HEALTH MAINTENANCE LETTER (OUTPATIENT)
Age: 26
End: 2021-01-03

## 2021-04-25 ENCOUNTER — HEALTH MAINTENANCE LETTER (OUTPATIENT)
Age: 26
End: 2021-04-25

## 2021-10-10 ENCOUNTER — HEALTH MAINTENANCE LETTER (OUTPATIENT)
Age: 26
End: 2021-10-10

## 2022-05-21 ENCOUNTER — HEALTH MAINTENANCE LETTER (OUTPATIENT)
Age: 27
End: 2022-05-21

## 2022-09-18 ENCOUNTER — HEALTH MAINTENANCE LETTER (OUTPATIENT)
Age: 27
End: 2022-09-18

## 2023-06-04 ENCOUNTER — HEALTH MAINTENANCE LETTER (OUTPATIENT)
Age: 28
End: 2023-06-04